# Patient Record
Sex: FEMALE | Race: WHITE | NOT HISPANIC OR LATINO | Employment: OTHER | URBAN - METROPOLITAN AREA
[De-identification: names, ages, dates, MRNs, and addresses within clinical notes are randomized per-mention and may not be internally consistent; named-entity substitution may affect disease eponyms.]

---

## 2017-08-02 ENCOUNTER — HOSPITAL ENCOUNTER (EMERGENCY)
Facility: HOSPITAL | Age: 52
Discharge: HOME | End: 2017-08-02
Attending: EMERGENCY MEDICINE
Payer: COMMERCIAL

## 2017-08-02 ENCOUNTER — HOSPITAL ENCOUNTER (EMERGENCY)
Dept: RADIOLOGY | Facility: HOSPITAL | Age: 52
Discharge: HOME | End: 2017-08-02
Payer: COMMERCIAL

## 2017-08-02 VITALS
DIASTOLIC BLOOD PRESSURE: 70 MMHG | SYSTOLIC BLOOD PRESSURE: 128 MMHG | HEIGHT: 67 IN | RESPIRATION RATE: 16 BRPM | BODY MASS INDEX: 36.88 KG/M2 | HEART RATE: 90 BPM | OXYGEN SATURATION: 100 % | TEMPERATURE: 99 F | WEIGHT: 235 LBS

## 2017-08-02 DIAGNOSIS — K52.9 COLITIS: ICD-10-CM

## 2017-08-02 DIAGNOSIS — K59.00 CONSTIPATION: Primary | ICD-10-CM

## 2017-08-02 LAB
ALBUMIN SERPL BCP-MCNC: 3.4 G/DL (ref 3.5–5)
ALP SERPL-CCNC: 79 U/L (ref 46–116)
ALT SERPL W P-5'-P-CCNC: 24 U/L (ref 12–78)
ANION GAP SERPL CALCULATED.3IONS-SCNC: 10 MMOL/L (ref 4–13)
AST SERPL W P-5'-P-CCNC: 22 U/L (ref 5–45)
BASOPHILS # BLD AUTO: 0 THOUSANDS/ΜL (ref 0–0.1)
BASOPHILS NFR BLD AUTO: 0 % (ref 0–1)
BILIRUB SERPL-MCNC: 0.7 MG/DL (ref 0.2–1)
BUN SERPL-MCNC: 6 MG/DL (ref 5–25)
CALCIUM SERPL-MCNC: 8.9 MG/DL (ref 8.3–10.1)
CHLORIDE SERPL-SCNC: 101 MMOL/L (ref 100–108)
CLARITY, POC: CLEAR
CO2 SERPL-SCNC: 29 MMOL/L (ref 21–32)
COLOR, POC: YELLOW
CREAT SERPL-MCNC: 0.8 MG/DL (ref 0.6–1.3)
EOSINOPHIL # BLD AUTO: 0 THOUSAND/ΜL (ref 0–0.61)
EOSINOPHIL NFR BLD AUTO: 0 % (ref 0–6)
ERYTHROCYTE [DISTWIDTH] IN BLOOD BY AUTOMATED COUNT: 14.4 % (ref 11.6–15.1)
EXT BILIRUBIN, UA: NORMAL
EXT BLOOD URINE: NORMAL
EXT GLUCOSE, UA: NORMAL
EXT KETONES: NORMAL
EXT NITRITE, UA: NORMAL
EXT PH, UA: 8
EXT PROTEIN, UA: NORMAL
EXT SPECIFIC GRAVITY, UA: 1
EXT UROBILINOGEN: 0.2
GFR SERPL CREATININE-BSD FRML MDRD: 85 ML/MIN/1.73SQ M
GLUCOSE SERPL-MCNC: 178 MG/DL (ref 65–140)
HCT VFR BLD AUTO: 39.7 % (ref 34.8–46.1)
HGB BLD-MCNC: 13 G/DL (ref 11.5–15.4)
LIPASE SERPL-CCNC: 52 U/L (ref 73–393)
LYMPHOCYTES # BLD AUTO: 1.9 THOUSANDS/ΜL (ref 0.6–4.47)
LYMPHOCYTES NFR BLD AUTO: 19 % (ref 14–44)
MCH RBC QN AUTO: 28.3 PG (ref 26.8–34.3)
MCHC RBC AUTO-ENTMCNC: 32.7 G/DL (ref 31.4–37.4)
MCV RBC AUTO: 87 FL (ref 82–98)
MONOCYTES # BLD AUTO: 0.37 THOUSAND/ΜL (ref 0.17–1.22)
MONOCYTES NFR BLD AUTO: 4 % (ref 4–12)
NEUTROPHILS # BLD AUTO: 7.8 THOUSANDS/ΜL (ref 1.85–7.62)
NEUTS SEG NFR BLD AUTO: 77 % (ref 43–75)
PLATELET # BLD AUTO: 184 THOUSANDS/UL (ref 149–390)
PMV BLD AUTO: 11.1 FL (ref 8.9–12.7)
POTASSIUM SERPL-SCNC: 4 MMOL/L (ref 3.5–5.3)
PROT SERPL-MCNC: 7.3 G/DL (ref 6.4–8.2)
RBC # BLD AUTO: 4.59 MILLION/UL (ref 3.81–5.12)
SODIUM SERPL-SCNC: 140 MMOL/L (ref 136–145)
T4 FREE SERPL-MCNC: 1.44 NG/DL (ref 0.76–1.46)
TSH SERPL DL<=0.05 MIU/L-ACNC: 0.28 UIU/ML (ref 0.36–3.74)
WBC # BLD AUTO: 10.07 THOUSAND/UL (ref 4.31–10.16)
WBC # BLD EST: NORMAL 10*3/UL

## 2017-08-02 PROCEDURE — 80053 COMPREHEN METABOLIC PANEL: CPT | Performed by: EMERGENCY MEDICINE

## 2017-08-02 PROCEDURE — 96375 TX/PRO/DX INJ NEW DRUG ADDON: CPT

## 2017-08-02 PROCEDURE — 96361 HYDRATE IV INFUSION ADD-ON: CPT

## 2017-08-02 PROCEDURE — 83690 ASSAY OF LIPASE: CPT | Performed by: EMERGENCY MEDICINE

## 2017-08-02 PROCEDURE — 74177 CT ABD & PELVIS W/CONTRAST: CPT

## 2017-08-02 PROCEDURE — 96374 THER/PROPH/DIAG INJ IV PUSH: CPT

## 2017-08-02 PROCEDURE — 84439 ASSAY OF FREE THYROXINE: CPT | Performed by: EMERGENCY MEDICINE

## 2017-08-02 PROCEDURE — 85025 COMPLETE CBC W/AUTO DIFF WBC: CPT | Performed by: EMERGENCY MEDICINE

## 2017-08-02 PROCEDURE — 36415 COLL VENOUS BLD VENIPUNCTURE: CPT | Performed by: EMERGENCY MEDICINE

## 2017-08-02 PROCEDURE — 84443 ASSAY THYROID STIM HORMONE: CPT | Performed by: EMERGENCY MEDICINE

## 2017-08-02 PROCEDURE — 99285 EMERGENCY DEPT VISIT HI MDM: CPT

## 2017-08-02 PROCEDURE — 81002 URINALYSIS NONAUTO W/O SCOPE: CPT | Performed by: EMERGENCY MEDICINE

## 2017-08-02 RX ORDER — DULOXETIN HYDROCHLORIDE 30 MG/1
90 CAPSULE, DELAYED RELEASE ORAL DAILY
COMMUNITY

## 2017-08-02 RX ORDER — MORPHINE SULFATE 60 MG/1
60 CAPSULE, EXTENDED RELEASE ORAL DAILY
COMMUNITY

## 2017-08-02 RX ORDER — ONDANSETRON 2 MG/ML
4 INJECTION INTRAMUSCULAR; INTRAVENOUS ONCE
Status: COMPLETED | OUTPATIENT
Start: 2017-08-02 | End: 2017-08-02

## 2017-08-02 RX ORDER — CLONIDINE HYDROCHLORIDE 0.1 MG/1
0.1 TABLET ORAL EVERY 12 HOURS SCHEDULED
COMMUNITY

## 2017-08-02 RX ORDER — FENTANYL CITRATE 50 UG/ML
75 INJECTION, SOLUTION INTRAMUSCULAR; INTRAVENOUS ONCE
Status: COMPLETED | OUTPATIENT
Start: 2017-08-02 | End: 2017-08-02

## 2017-08-02 RX ORDER — METRONIDAZOLE 500 MG/1
500 TABLET ORAL 2 TIMES DAILY
Qty: 14 TABLET | Refills: 0 | Status: SHIPPED | OUTPATIENT
Start: 2017-08-02 | End: 2017-08-09

## 2017-08-02 RX ORDER — AMITRIPTYLINE HYDROCHLORIDE 10 MG/1
50 TABLET, FILM COATED ORAL
COMMUNITY

## 2017-08-02 RX ORDER — LEVOTHYROXINE SODIUM 0.12 MG/1
100 TABLET ORAL DAILY
COMMUNITY

## 2017-08-02 RX ORDER — OXYCODONE HYDROCHLORIDE 30 MG/1
30 TABLET, FILM COATED, EXTENDED RELEASE ORAL 4 TIMES DAILY
COMMUNITY

## 2017-08-02 RX ORDER — PREGABALIN 100 MG/1
100 CAPSULE ORAL 2 TIMES DAILY
COMMUNITY

## 2017-08-02 RX ORDER — SULFAMETHOXAZOLE AND TRIMETHOPRIM 800; 160 MG/1; MG/1
1 TABLET ORAL 2 TIMES DAILY
Qty: 14 TABLET | Refills: 0 | Status: SHIPPED | OUTPATIENT
Start: 2017-08-02 | End: 2017-08-09

## 2017-08-02 RX ORDER — LISINOPRIL 10 MG/1
10 TABLET ORAL DAILY
COMMUNITY

## 2017-08-02 RX ORDER — PRAVASTATIN SODIUM 10 MG
10 TABLET ORAL DAILY
COMMUNITY

## 2017-08-02 RX ADMIN — FENTANYL CITRATE 75 MCG: 50 INJECTION, SOLUTION INTRAMUSCULAR; INTRAVENOUS at 14:46

## 2017-08-02 RX ADMIN — IOHEXOL 100 ML: 350 INJECTION, SOLUTION INTRAVENOUS at 19:10

## 2017-08-02 RX ADMIN — ONDANSETRON 4 MG: 2 INJECTION INTRAMUSCULAR; INTRAVENOUS at 14:47

## 2017-08-02 RX ADMIN — SODIUM CHLORIDE 1000 ML: 0.9 INJECTION, SOLUTION INTRAVENOUS at 14:46

## 2019-05-12 ENCOUNTER — HOSPITAL ENCOUNTER (EMERGENCY)
Facility: HOSPITAL | Age: 54
Discharge: HOME | End: 2019-05-12
Attending: EMERGENCY MEDICINE | Admitting: EMERGENCY MEDICINE
Payer: COMMERCIAL

## 2019-05-12 ENCOUNTER — APPOINTMENT (EMERGENCY)
Dept: RADIOLOGY | Facility: HOSPITAL | Age: 54
End: 2019-05-12
Payer: COMMERCIAL

## 2019-05-12 VITALS
BODY MASS INDEX: 38.45 KG/M2 | SYSTOLIC BLOOD PRESSURE: 123 MMHG | RESPIRATION RATE: 16 BRPM | DIASTOLIC BLOOD PRESSURE: 58 MMHG | HEART RATE: 90 BPM | HEIGHT: 67 IN | OXYGEN SATURATION: 97 % | WEIGHT: 245 LBS | TEMPERATURE: 96.8 F

## 2019-05-12 DIAGNOSIS — S80.211A ABRASION, RIGHT KNEE, INITIAL ENCOUNTER: ICD-10-CM
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
## 2019-05-12 DIAGNOSIS — S20.411A ABRASION OF RIGHT SIDE OF BACK, INITIAL ENCOUNTER: ICD-10-CM

## 2019-05-12 DIAGNOSIS — S82.831A CLOSED AVULSION FRACTURE OF DISTAL END OF RIGHT FIBULA, INITIAL ENCOUNTER: ICD-10-CM

## 2019-05-12 PROCEDURE — 73630 X-RAY EXAM OF FOOT: CPT

## 2019-05-12 PROCEDURE — 90471 IMMUNIZATION ADMIN: CPT

## 2019-05-12 PROCEDURE — 90715 TDAP VACCINE 7 YRS/> IM: CPT | Performed by: EMERGENCY MEDICINE

## 2019-05-12 PROCEDURE — 73610 X-RAY EXAM OF ANKLE: CPT

## 2019-05-12 PROCEDURE — 99284 EMERGENCY DEPT VISIT MOD MDM: CPT | Performed by: EMERGENCY MEDICINE

## 2019-05-12 PROCEDURE — 99283 EMERGENCY DEPT VISIT LOW MDM: CPT

## 2019-05-12 RX ADMIN — TETANUS TOXOID, REDUCED DIPHTHERIA TOXOID AND ACELLULAR PERTUSSIS VACCINE, ADSORBED 0.5 ML: 5; 2.5; 8; 8; 2.5 SUSPENSION INTRAMUSCULAR at 20:00

## 2019-05-14 ENCOUNTER — OFFICE VISIT (OUTPATIENT)
Dept: OBGYN CLINIC | Facility: CLINIC | Age: 54
End: 2019-05-14
Payer: COMMERCIAL

## 2019-05-14 VITALS
HEIGHT: 68 IN | BODY MASS INDEX: 37.13 KG/M2 | DIASTOLIC BLOOD PRESSURE: 75 MMHG | WEIGHT: 245 LBS | HEART RATE: 86 BPM | SYSTOLIC BLOOD PRESSURE: 111 MMHG

## 2019-05-14 DIAGNOSIS — S93.491A SPRAIN OF ANTERIOR TALOFIBULAR LIGAMENT OF RIGHT ANKLE, INITIAL ENCOUNTER: Primary | ICD-10-CM

## 2019-05-14 PROCEDURE — 99203 OFFICE O/P NEW LOW 30 MIN: CPT | Performed by: ORTHOPAEDIC SURGERY

## 2019-05-24 ENCOUNTER — EVALUATION (OUTPATIENT)
Dept: PHYSICAL THERAPY | Facility: CLINIC | Age: 54
End: 2019-05-24
Payer: COMMERCIAL

## 2019-05-24 DIAGNOSIS — S93.491A SPRAIN OF ANTERIOR TALOFIBULAR LIGAMENT OF RIGHT ANKLE, INITIAL ENCOUNTER: ICD-10-CM

## 2019-05-24 PROCEDURE — 97110 THERAPEUTIC EXERCISES: CPT | Performed by: PHYSICAL THERAPIST

## 2019-05-24 PROCEDURE — 97162 PT EVAL MOD COMPLEX 30 MIN: CPT | Performed by: PHYSICAL THERAPIST

## 2019-05-29 ENCOUNTER — APPOINTMENT (OUTPATIENT)
Dept: PHYSICAL THERAPY | Facility: CLINIC | Age: 54
End: 2019-05-29
Payer: COMMERCIAL

## 2021-06-16 ENCOUNTER — OFFICE VISIT (OUTPATIENT)
Dept: BARIATRICS | Facility: CLINIC | Age: 56
End: 2021-06-16

## 2021-06-16 VITALS
RESPIRATION RATE: 16 BRPM | HEIGHT: 66 IN | TEMPERATURE: 97.8 F | BODY MASS INDEX: 40.29 KG/M2 | WEIGHT: 250.7 LBS | DIASTOLIC BLOOD PRESSURE: 72 MMHG | HEART RATE: 91 BPM | SYSTOLIC BLOOD PRESSURE: 118 MMHG

## 2021-06-16 DIAGNOSIS — E66.01 OBESITY, CLASS III, BMI 40-49.9 (MORBID OBESITY) (HCC): Primary | ICD-10-CM

## 2021-06-16 PROCEDURE — RECHECK

## 2021-06-16 RX ORDER — LIDOCAINE 50 MG/G
1 PATCH TOPICAL DAILY
COMMUNITY

## 2021-06-16 RX ORDER — OLANZAPINE 7.5 MG/1
7.5 TABLET ORAL
COMMUNITY

## 2021-06-16 RX ORDER — ESCITALOPRAM OXALATE 20 MG/1
20 TABLET ORAL
COMMUNITY

## 2021-06-16 NOTE — PROGRESS NOTES
Bariatric Behavioral Health Evaluation    Presenting Problem patient here to improve health, increase activity, reduce chronic pain and prevent family disease  Is the patient seeking Bariatric Surgery Eval? Yes  If yes how long have you researched this surgery option  Realizes Post- Op Requirements? Yes     Pre-morbid level of function and history of present illness: patient has medical issues  Psychiatric/Psychological Treatment Diagnosis: patient has Mental Health diagnosis of Bipolar II  Patient is prescribed medication by JUAN at [facility]  Outpatient Counselor Yes  Counselor Tiana Goddard Phone  Has been doing phone therapy once a month for the past year due to Hilario  Psychiatrist Yes   Celena Box (does not know last name )  Has seen Jose Kimball for the past year  Sees him every two months  Psychiatrist Phone     Have you had Inpatient Treatment? No    Family Constellation (include relationship with each and Psych/Med HX)    Additional comments/stressors related to family/relationships/peer support  Patient is on disability since 2015; has recently lost his job  Patient and spouse are raising their grandchildren, Stephan Garcia 25 and David Woo, 15  Have Physical custody of them since birth  Mother (their daughter) has substance abuse issues  Physical/Psychological Assessment:     Appearance: Tired  Sociability: quiet  Affect: flat  Mood: sad  Thought Process: coherent  Speech: normal  Content: no impairment  Orientation: person  Yes , place  Yes , time  Yes , normal attention span  Yes , normal memory  Yes   and normal judgement  Yes   Insight: emotional  fair    Risk Assessment:     none    Recommendations: Patient may require psychiatric evaluation  Will have office SW determine if it is needed  Observation:     Interviews this interview only       Based on the previous information, the client presents the following risk of harm to self or others: low     Note Patient reports Mental Health diagnosis of Bipolar II  She is prescribed medication by JUAN at [facility]  Patient was educated regarding the impact of nicotine and alcohol of the post surgery bariatric patient  Evaluation not completed at this time  Follow up appointment scheduled with AG to complete evaluation and determine if psychiatric evaluation will be required  BARIATRIC SURGERY EDUCATION CHECKLIST    I have received education related to my bariatric surgery process and understand:    Patients may be required to complete a psychiatric evaluation and receive clearance for surgery from their psychiatrist     Patients who undergo weight loss surgery are at higher risk of increased mental health concerns and suicide attempts  Patients may be required to complete a full substance abuse evaluation and then complete all treatment recommendations prior to surgery  If diagnosis of abuse/dependence results, patient may be required to remain sober for one (1) year before having bariatric surgery  Patients on psychiatric medications should check with their provider to discuss psychiatric medications and the changes in absorption  Patient should discuss all time release medications with provider and take all medications as prescribed  The recommendation is that there is no use of  any tobacco products, Hookah or  vapes for the bariatric post-operation patient  Bariatric surgery patients should not consume alcohol as a post-operative patient as it may increase risk of numerous health conditions including but not limited to alcohol abuse and ulcers  There is a possibility of weight regain if patient does not follow all program guidelines and recommendations  Bariatric surgery patients should exercise thirty (30) to sixty (60) minutes per day to maintain post-surgical weight loss      Research indicates that bariatric patients are more successful when they see a therapist for up to two (2) years post-op  Patients will follow all medical and dietary recommendations provided  Patient will keep all scheduled appointments and follow up with their physician for a minimum of five (5) years  Patient will take all vitamins as recommended  Post-operative vitamins are life-long  Patient reviewed Bariatric Surgery Education Checklist and agrees they have received education on these issues

## 2021-06-16 NOTE — PROGRESS NOTES
Bariatric Nutrition Assessment Note    Type of surgery    Preop  Surgery Date: TBD  Surgeon: Dr Ernestina Zavala  63 y o   female     Wt with BMI of 25: 156 lbs  Pre-Op Excess Wt: 94#  Blood pressure 118/72, pulse 91, temperature 97 8 °F (36 6 °C), temperature source Tympanic, resp  rate 16, height 5' 6 3" (1 684 m), weight 114 kg (250 lb 11 2 oz), not currently breastfeeding  Body mass index is 40 1 kg/m²     Bear Anthony Equation 1749 BMR                                          [id number] cals to lose 1-2 lb/week    Weight History   Onset of Obesity: Adult  Family history of obesity: No  Wt Loss Attempts: Commercial Programs ([email or web address], [employer], etc )  Self Created Diets (Portion Control, Healthy Food Choices, etc )  Maximum Wt Lost: 30 lbs not drinking alcohol    Review of History and Medications   Past Medical History:   Diagnosis Date    Chronic low back pain     Chronic pain     Diabetes mellitus ([address] )     Diabetic neuropathy, painful (HCC)     Disease of thyroid gland     Hyperlipidemia     Hypertension     Obesity     Psychiatric disorder      Past Surgical History:   Procedure Laterality Date    APPENDECTOMY      CARPAL TUNNEL RELEASE Bilateral     FOOT NEUROMA SURGERY Left     NERVE BLOCK      B/L simpethetic nerve block x3    SACRAL NERVE STIMULATOR PLACEMENT      SPINE SURGERY       Social History     Socioeconomic History    Marital status: /Civil Union     Spouse name: None    Number of children: None    Years of education: None    Highest education level: None   Occupational History    None   Tobacco Use    Smoking status: Never Smoker    Smokeless tobacco: Never Used    Tobacco comment: only medical marijuana   Substance and Sexual Activity    Alcohol use: No    Drug use: No    Sexual activity: None   Other Topics Concern    None   Social History Narrative    None     Social Determinants of Health Financial Resource Strain:     Difficulty of Paying Living Expenses:    Food Insecurity:     Worried About Running Out of Food in the Last Year:     [address] in the Last Year:    Transportation Needs:     Lack of Transportation (Medical):      Lack of Transportation (Non-Medical):    Physical Activity:     Days of Exercise per Week:     Minutes of Exercise per Session:    Stress:     Feeling of Stress :    Social Connections:     Frequency of Communication with Friends and Family:     Frequency of Social Gatherings with Friends and Family:     Attends Christianity Services:     Active Member of Clubs or Organizations:     Attends Club or Organization Meetings:     Marital Status:    Intimate Partner Violence:     Fear of Current or Ex-Partner:     Emotionally Abused:     Physically Abused:     Sexually Abused:        Current Outpatient Medications:     amitriptyline (ELAVIL) 10 mg tablet, Take 50 mg by mouth daily at bedtime , Disp: , Rfl:     Clobetasol Propionate 0 025 % CREA, Apply topically, Disp: , Rfl:     DULoxetine (CYMBALTA) 30 mg delayed release capsule, Take 90 mg by mouth daily  , Disp: , Rfl:     levothyroxine 125 mcg tablet, Take 100 mcg by mouth daily , Disp: , Rfl:     lidocaine (LIDODERM) 5 %, Apply 1 patch topically daily Remove & Discard patch within 12 hours or as directed by MD, Disp: , Rfl:     lisinopril (ZESTRIL) 10 mg tablet, Take 10 mg by mouth daily, Disp: , Rfl:     metFORMIN (GLUCOPHAGE) 500 mg tablet, Take 250 mg by mouth 2 (two) times a day with meals, Disp: , Rfl:     OLANZapine (ZyPREXA) 7 5 mg tablet, Take 7 5 mg by mouth daily at bedtime, Disp: , Rfl:     other medication, see sig,, Medical Marijuana, Disp: , Rfl:     OxyCODONE HCl ER (OxyCONTIN) 30 MG T12A, Take 30 mg by mouth 4 (four) times a day, Disp: , Rfl:     pravastatin (PRAVACHOL) 10 mg tablet, Take 10 mg by mouth daily, Disp: , Rfl:     pregabalin (LYRICA) 100 mg capsule, Take 100 mg by mouth 2 (two) times a day, Disp: , Rfl:     TRAZODONE HCL PO, Take 50 mg by mouth 50mg to 100mg daily as needed, Disp: , Rfl:     cloNIDine (CATAPRES) 0 1 mg tablet, Take 0 1 mg by mouth every 12 (twelve) hours, Disp: , Rfl:     escitalopram (Lexapro) 20 mg tablet, Take 20 mg by mouth, Disp: , Rfl:     morphine (SCARLET) 60 MG 24 hr capsule, Take 60 mg by mouth daily, Disp: , Rfl:   Food Intake and Lifestyle Assessment   Food Intake Assessment completed via usual diet recall  Breakfast: Haris Charms or Honey Nut Cheerios other cereal dry  Snack: occ Glucerna  Lunch: More cereal   Snack: 0  Dinner: Cereal or meat, starch, vegetable  Snack: Cereal   Eating like this over a year, used to be [employer] intake: water and regular soda-1 soda per day  Protein supplement: 0  Estimated protein intake per day: 30 g  Estimated fluid intake per day: 96 oz  Meals eaten away from home: takeout once a week or every 2 weeks  Typical meal pattern: 3 meals per day and 0 snacks per day  Eating Behaviors: Binge eating and Craves sweet foods  Food allergies or intolerances:    Allergies   Allergen Reactions    Aripiprazole Hives and GI Intolerance    Belbuca [Buprenorphine Hcl] Hives    Buspar [Buspirone] Hives    Gabapentin Hives    Lamictal [Lamotrigine] Hives    Morphine Hives    Oxycodone Hives and GI Intolerance     Generic Form of Oxy     Cultural or Yazidism considerations: N/A    Physical Assessment  Physical Activity  Types of exercise: None  Current physical limitations: diabetic neuropathy in feet    Psychosocial Assessment   Support systems: spouse  Socioeconomic factors: lives with spouse    Nutrition Diagnosis  Diagnosis: Overweight / Obesity (NC-3 3)  Related to: Physical inactivity and Excessive energy intake  As Evidenced by: BMI >25     Nutrition Prescription: Recommend the following diet  Low fat, Low sugar and High protein    Interventions and Teaching   Discussed pre-op and post-op nutrition guidelines  Patient educated and handouts provided  Surgical changes to stomach / GI  Capacity of post-surgery stomach  Diet progression  Adequate hydration  Sugar and fat restriction to decrease "dumping syndrome"  Fat restriction to decrease steatorrhea  Expected weight loss  Weight loss plateaus/ possibility of weight regain  Exercise  Suggestions for pre-op diet  Nutrition considerations after surgery  Protein supplements  Meal planning and preparation  Appropriate carbohydrate, protein, and fat intake, and food/fluid choices to maximize safe weight loss, nutrient intake, and tolerance   Dietary and lifestyle changes  Possible problems with poor eating habits  Intuitive eating  Techniques for self monitoring and keeping daily food journal  Potential for food intolerance after surgery, and ways to deal with them including: lactose intolerance, nausea, reflux, vomiting, diarrhea, food intolerance, appetite changes, gas  Vitamin / Mineral supplementation of Multivitamin with minerals and Vitamin D    Education provided to: patient    Barriers to learning: No barriers identified  Readiness to change: preparation    Prior research on procedure: discussed with provider    Comprehension: verbalizes understanding     Expected Compliance: good  Recommendations  Pt is an appropriate candidate for surgery   Yes  Evaluation / Monitoring  Dietitian to Monitor: Eating pattern as discussed Body weight Lab values Physical activity    Goals  Food journal, Exercise 30 minutes 5 times per week, Complete lession plans 1-6 and Eat 3 meals per day    Time Spent:   1 Hour

## 2021-07-01 ENCOUNTER — OFFICE VISIT (OUTPATIENT)
Dept: BARIATRICS | Facility: CLINIC | Age: 56
End: 2021-07-01

## 2021-07-01 DIAGNOSIS — E66.01 OBESITY, CLASS III, BMI 40-49.9 (MORBID OBESITY) (HCC): Primary | ICD-10-CM

## 2021-07-01 PROCEDURE — RECHECK: Performed by: SOCIAL WORKER

## 2021-07-01 NOTE — PROGRESS NOTES
Pablo Lara  is a 63 y o    female    :  1965  WT [address] [place] [address]    Target weight: 243   Current patient status: losing weight to 246 6   She will need Psychiatric evaluation from her practitioner at [facility]  She will be taking the form and our letter and the Release of information to the next appointment with her therapist  On   Discussion around stability of her bi-polar disorder  The doctor has needed to adjust her medications two  Months ago or so  She has been involved with [facility] for three years  Workflow:  · Psych and/or D+A Clearance: needed  Goals of research on bariatric surgery and join the Facebook group  Next appointment is scheduled for * with RAYMOND Elaine, ELADIAW  _____________________________

## 2022-09-22 PROBLEM — F31.81 BIPOLAR II DISORDER (HCC): Status: ACTIVE | Noted: 2022-09-22

## 2022-09-22 PROBLEM — F33.1 MAJOR DEPRESSIVE DISORDER, RECURRENT EPISODE, MODERATE (HCC): Status: ACTIVE | Noted: 2022-09-22

## 2022-09-26 ENCOUNTER — SOCIAL WORK (OUTPATIENT)
Dept: BEHAVIORAL/MENTAL HEALTH CLINIC | Facility: CLINIC | Age: 57
End: 2022-09-26
Payer: COMMERCIAL

## 2022-09-26 DIAGNOSIS — F33.1 MAJOR DEPRESSIVE DISORDER, RECURRENT EPISODE, MODERATE (HCC): Primary | ICD-10-CM

## 2022-09-26 PROCEDURE — 90834 PSYTX W PT 45 MINUTES: CPT | Performed by: COUNSELOR

## 2022-09-26 NOTE — PSYCH
Psychotherapy Provided: Individual Psychotherapy 50 minutes     Length of time in session: 50 minutes, follow up in 1 month  Start time:  10:00am  End time:  10:50am    Encounter Diagnosis     ICD-10-CM    1  Major depressive disorder, recurrent episode, moderate (HCC)  F33 1        Goals addressed in session: Goal 1     Pain:      none    0    Current suicide risk : Low     D: Clara was seen in the office today  She reported that her granddaughter, who she raised, is doing better and not as hostile toward her  She said this is "wonderful" and said she hopes she continues to express herself more  Carol Armstrong said that Rodger Correa is telling her she loves her again saying "she hasn't said this for a very long time "  Carol Armstrong shared that she still sleeps a lot and never feels rested  She said her PCP gave her a referral for a sleep study a month ago but she's not done anything about it  A:  Carol Armstrong had full affect  No SI/HI/SA was reported  She said she lost 5-6 pounds and is happy about this  She was engaged and talkative in our session today  P:  Carol Armstrong said she wants to make an effort to call for her sleep study saying she knows it could possible take about a month or so to get an appointment  Behavioral Health Treatment Plan [facility]: Diagnosis and Treatment Plan explained to Tejas Ann relates understanding diagnosis and is agreeable to Treatment Plan   Yes

## 2022-11-15 ENCOUNTER — SOCIAL WORK (OUTPATIENT)
Dept: BEHAVIORAL/MENTAL HEALTH CLINIC | Facility: CLINIC | Age: 57
End: 2022-11-15

## 2022-11-15 DIAGNOSIS — F33.1 MAJOR DEPRESSIVE DISORDER, RECURRENT EPISODE, MODERATE (HCC): Primary | ICD-10-CM

## 2022-11-15 DIAGNOSIS — F31.81 BIPOLAR II DISORDER (HCC): ICD-10-CM

## 2022-11-15 NOTE — PSYCH
Psychotherapy Provided: Individual Psychotherapy 45 minutes     Length of time in session: 45 minutes, follow up in 1 month  Encounter Diagnosis     ICD-10-CM    1  Major depressive disorder, recurrent episode, moderate ([facility])  F33 1    2  Bipolar II disorder ([facility] )  F31 81        Goals addressed in session: Goal 1     Pain:      none    0    Current suicide risk : Low     D:  Hilario Acosta said she feels somewhat depressed due to her 13year old granddaughter, Georgie Galaviz, who she's raised most all of her life  She went into detail about the issues they are having and is concerned that she's doing all she can for her  She said "otherwise, I wouldn't feel as depressed and feels it's all due to Tyoria Anastacia  A:  Full affect  No SI/HI/SA  Hilario Acosta scored 12 today on the PHQ-9 screener indicating moderate depression  She was engaged and talkative in our session today   P:  Deena Sun she called for a sleep study but has not heard back to schedule yet  She said she continues to feel very tired all the time and can't sit to watch a movie in it's entirety without falling asleep  P:  Hilario Acosta will practice good self care and do nice things for herself to offset what she's feeling regarding her granddaughter  Behavioral Health Treatment Plan [facility]: Diagnosis and Treatment Plan explained to Shantelle Gonzalez relates understanding diagnosis and is agreeable to Treatment Plan   Yes     Visit start and stop times:    11/15/22  Start Time: 1005  Stop Time: 1050  Total Visit Time: 45 minutes

## 2022-12-14 ENCOUNTER — SOCIAL WORK (OUTPATIENT)
Dept: BEHAVIORAL/MENTAL HEALTH CLINIC | Facility: CLINIC | Age: 57
End: 2022-12-14

## 2022-12-14 DIAGNOSIS — F31.81 BIPOLAR II DISORDER (HCC): ICD-10-CM

## 2022-12-14 DIAGNOSIS — F33.1 MAJOR DEPRESSIVE DISORDER, RECURRENT EPISODE, MODERATE (HCC): Primary | ICD-10-CM

## 2022-12-14 NOTE — BH TREATMENT PLAN
Morris Ya  1965       Date of Initial Treatment Plan: 12/14/22   Date of Current Treatment Plan: 12/14/22    Treatment Plan Number 1     Strengths/Personal Resources for Self Care: Responsible, caring, kind  Diagnosis:   1  Major depressive disorder, recurrent episode, moderate ([facility] )        2  Bipolar II disorder ([facility] )            Area of Needs: Depression       Long Term Goal 1: I want to feel more connected to my granddaughter Susen Koyanagi    Target Date: 6-14-23  Completion Date: TBD         Short Term Objectives for Goal 1: Try to find a common interests to do from time to time to bond and connect  Long Term Goal 2: Figure out my sleep issue  Target Date: 6-14-23  Completion Date: TBD    Short Term Objectives for Goal 2: N/A and Go for a sleep study in January 2023             GOAL 1: Modality:  Communication and interpersonal skills  [facility] Health [facility]: Diagnosis and Treatment Plan explained to Trey simental understanding diagnosis and is agreeable to Treatment Plan

## 2022-12-14 NOTE — PSYCH
Psychotherapy Provided: Individual Psychotherapy 50 minutes     Length of time in session: 50 minutes, follow up in 2 months  Encounter Diagnosis     ICD-10-CM    1  Major depressive disorder, recurrent episode, moderate ([facility])  F33 1       2  Bipolar II disorder ([facility] )  F31 81           Goals addressed in session: Goal 1     Pain:      none    0    Current suicide risk : Low     D:  Clara shared that she is scheduled for her sleep study on January 3rd  She said she continues to feel very tired through the day and can't read or watch a movie saying she will fall asleep  She also commented that she is being lowed on the Oxycodone saying she's been on a certain dose for years and doesn't feel this is calling her sleepiness but said "it will be interesting to see if this is causing the issue "  Adin Patel talked about her granddaughter Brionna Grove and how challenging she can be  She said she would like to find ways to bond with her more in the hopes of having a better relationship with Brionna Grove  A:  Brionna Grove had full affect  No SI/HI/SA  She was engaged and talkative  She was 10 minutes late for her appointment today saying she was talking with her  and time got away from her  P:  Clara shared something that her grandson Ace Dawkins had said to her  Clara and her  Maryam How have raised Brionna Grove and Ace Dawkins since they were little and now one is at college and another in high school  She said she felt "hurt" when Ace Dawkins commented that her and Maryam How shouldn't have been the ones to raise them and that "should have been my mother and father", as per Clara's reporting today  Adin Patel said she is okay now but said at the time, she was hurt due to some other things he said to her too  P:  Adin Patel will go for her sleep study and practice good self-care      Behavioral Health Treatment Plan [facility]: Diagnosis and Treatment Plan explained to Felisa Tierney relates understanding diagnosis and is agreeable to Treatment Plan   Yes     Visit start and stop times:    12/14/22  Start Time: 1010  Stop Time: 1100  Total Visit Time: 50 minutes

## 2023-01-30 ENCOUNTER — SOCIAL WORK (OUTPATIENT)
Dept: BEHAVIORAL/MENTAL HEALTH CLINIC | Facility: CLINIC | Age: 58
End: 2023-01-30

## 2023-01-30 DIAGNOSIS — F33.1 MAJOR DEPRESSIVE DISORDER, RECURRENT EPISODE, MODERATE (HCC): Primary | ICD-10-CM

## 2023-01-30 DIAGNOSIS — F31.81 BIPOLAR II DISORDER (HCC): ICD-10-CM

## 2023-01-30 NOTE — PSYCH
Behavioral Health Psychotherapy Progress Note    Psychotherapy Provided: Individual Psychotherapy     1  Major depressive disorder, recurrent episode, moderate ([facility] )        2  Bipolar II disorder ([facility] )            Goals addressed in session: Goal 1     DATA: Did crisis plan today with client  It would not print so I could not obtain a wet signature today  Jillian Carroll shared that her biggest stressor continues to be her granddaughter Joi Ariza  She said "I can't do anything right "  Joi Ariza will turn 16 in April  Jillian Carroll has raised her and her half brother since they were babies  She said she takes things one day at a time and we talked about the importance of good self care  She said she had her sleep study and goes tomorrow for the results  During this session, this clinician used the following therapeutic modalities: Solution-Focused Therapy    Substance Abuse was not addressed during this session  If the client is diagnosed with a co-occurring substance use disorder, please indicate any changes in the frequency or amount of use:N/A  Stage of change for addressing substance use diagnoses: No substance use/Not applicable    ASSESSMENT:  Tommy Torres presents with a Euthymic/ normal and Dysthymic mood  her affect is Normal range and intensity, which is congruent, with her mood and the content of the session  The client has made progress on their goals  Tommy Torres presents with a none risk of suicide, none risk of self-harm, and none risk of harm to others  For any risk assessment that surpasses a "low" rating, a safety plan must be developed  A safety plan was indicated: no  If yes, describe in detail     PLAN: Between sessions, Tommy Torres will do things for good self care to manage the stress she is feeling  At the next session, the therapist will use Solution-Focused Therapy to address issues she has with her granddaughter      Behavioral Health Treatment Plan and Discharge Planning: Lauro Farmer is aware of and agrees to continue to work on their treatment plan  They have identified and are working toward their discharge goals   yes    Visit start and stop times:    01/30/23  Start Time: 1000  Stop Time: 1050  Total Visit Time: 50 minutes

## 2023-02-27 DIAGNOSIS — F31.81 BIPOLAR II DISORDER (HCC): ICD-10-CM

## 2023-02-27 DIAGNOSIS — F33.1 MAJOR DEPRESSIVE DISORDER, RECURRENT EPISODE, MODERATE (HCC): Primary | ICD-10-CM

## 2023-02-27 NOTE — TELEPHONE ENCOUNTER
Medication Refill Request     Name of Medication olanzapine  Dose/Frequency 10mg qhs  Quantity 30  Verified pharmacy   [x]  Verified ordering Provider   [x]  Does patient have enough for the next 3 days? Yes [] No [x]  Does patient have a follow-up appointment scheduled?  Yes [x] No []   If so when is appointment: 04/06/2023

## 2023-02-28 RX ORDER — OLANZAPINE 10 MG/1
10 TABLET ORAL
Qty: 30 TABLET | Refills: 1 | Status: SHIPPED | OUTPATIENT
Start: 2023-02-28

## 2023-03-07 ENCOUNTER — SOCIAL WORK (OUTPATIENT)
Dept: BEHAVIORAL/MENTAL HEALTH CLINIC | Facility: CLINIC | Age: 58
End: 2023-03-07

## 2023-03-07 DIAGNOSIS — F31.81 BIPOLAR II DISORDER (HCC): ICD-10-CM

## 2023-03-07 DIAGNOSIS — F33.1 MAJOR DEPRESSIVE DISORDER, RECURRENT EPISODE, MODERATE (HCC): Primary | ICD-10-CM

## 2023-03-07 NOTE — PSYCH
Behavioral Health Psychotherapy Progress Note    Psychotherapy Provided: Individual Psychotherapy     1  Major depressive disorder, recurrent episode, moderate ([facility] [address] )        2  Bipolar II disorder ([facility] )            Goals addressed in session: Goal 1     DATA:   Delia Clay said she got the results from her sleep study and said she does not have sleep apnea  She said she still nods off when at home and is watching TV and said she's fine when she's more interactive with someone or an activity  Delia Clay talked today about the stress and concern she has regarding her granddaughter, who client raised and is now in 10th grade, and her anxiety an poor grades and emotional breakdowns at school  She feels a bit like she's "not doing enough" but this clinician reminded her that she is talking to the school and [facility] to get her all the help she needs  During this session, this clinician used the following therapeutic modalities: Solution-Focused Therapy    Substance Abuse was not addressed during this session  If the client is diagnosed with a co-occurring substance use disorder, please indicate any changes in the frequency or amount of use: N/A  Stage of change for addressing substance use diagnoses: No substance use/Not applicable    ASSESSMENT:  Murali Negron presents with a Euthymic/ normal and Dysthymic mood  Delia Clay scored 10 today on the PHQ-9 screener indicating mild/moderate depression  She said she would have scored lower if not for her granddaughter's issues and her concern for her      her affect is Normal range and intensity and Flat, which is congruent, with her mood and the content of the session  The client has made progress on their goals  Murali Negron presents with a none risk of suicide, none risk of self-harm, and none risk of harm to others  For any risk assessment that surpasses a "low" rating, a safety plan must be developed      A safety plan was indicated: no  If yes, describe in detail N/A    PLAN: Between sessions, Yu Luis will try to be a little more active due to her sleep issue during the day and be mindful that she's doing all she can to help her granddaughter and give herself credit for this  At the next session, the therapist will use Solution-Focused Therapy to address goals/needs/concerns  Behavioral Health Treatment Plan and Discharge Planning: Yu Luis is aware of and agrees to continue to work on their treatment plan  They have identified and are working toward their discharge goals   yes    Visit start and stop times:    03/07/23  Start Time: 0900  Stop Time: 0948  Total Visit Time: 48 minutes

## 2023-04-03 RX ORDER — OLANZAPINE 10 MG/1
10 TABLET ORAL
Status: CANCELLED | OUTPATIENT
Start: 2023-04-03

## 2023-04-06 ENCOUNTER — TELEMEDICINE (OUTPATIENT)
Dept: PSYCHIATRY | Facility: CLINIC | Age: 58
End: 2023-04-06

## 2023-04-06 DIAGNOSIS — F33.1 MAJOR DEPRESSIVE DISORDER, RECURRENT EPISODE, MODERATE (HCC): Primary | ICD-10-CM

## 2023-04-06 DIAGNOSIS — F31.81 BIPOLAR II DISORDER (HCC): ICD-10-CM

## 2023-04-06 RX ORDER — OLANZAPINE 5 MG/1
TABLET ORAL
Qty: 30 TABLET | Refills: 1 | Status: SHIPPED | OUTPATIENT
Start: 2023-04-06

## 2023-04-06 NOTE — PATIENT INSTRUCTIONS
Wants to stop Zyprexa  Decrease dose to 5 mg  Will stop if she wants after trip to [facility]  See Therapist  Will call for an appointment if she feels she needs too

## 2023-04-06 NOTE — PSYCH
Virtual Regular Visit    Verification of patient location:at home    Patient is located in the following state in which I hold an active license [place]      Assessment/Plan:       Diagnoses and all orders for this visit:    Major depressive disorder, recurrent episode, moderate (HCC)  -     OLANZapine (ZyPREXA) 5 mg tablet; Take 1 PO Q HS    Bipolar II disorder (HCC)  -     OLANZapine (ZyPREXA) 5 mg tablet; Take 1 PO Q HS          Goals addressed in session:   Good Health  Decrease meds  Counseling provided:      Treatment Recommendations- Risks Benefits       Immediate Medical/Psychiatric/Psychotherapy Treatments and Any Precautions:     Risks, Benefits And Possible Side Effects Of Medications:  Risks, benefits, and possible side effects of medications explained to patient and patient verbalizes understanding    Controlled Medication Discussion: The patient has been filling controlled prescriptions on time as prescribed to [place] Prescription Drug Monitoring program      Reason for visit is No chief complaint on file  Medication Management     Encounter provider JUAN Vieira    Provider [place] at [address] [place] [address]  [phone]      Recent Visits  No visits were found meeting these conditions  Showing recent visits within past 7 days and meeting all other requirements  Today's Visits  Date Type Provider Dept   04/06/23 Telemedicine Rolf Holland [facility] today's visits and meeting all other requirements  Future Appointments  No visits were found meeting these conditions  Showing future appointments within next 150 days and meeting all other requirements       The patient was identified by name and date of birth  Meng Scott was informed that this is a telemedicine visit and [facility] the [facility]  She agrees to proceed  [facility] Janna "My office door was closed  No one else was in the room  She acknowledged consent and understanding of privacy and security of the video platform  The patient has agreed to participate and understands they can discontinue the visit at any time  Patient is aware this is a billable service  Subjective    Zachary Jones is a 61 y o  female    Here today for a med check  This was via [facility]    normal appetite      HPI Mood good  Stable  Denies anxiety  Ran out of Zyprexa and has not been on it since last month  \"Back on it now\"  Wants to stop it  No problems with medication   Appetite Sleep good  Health OK  Denies SI/HI    Past Medical History:   Diagnosis Date   • Chronic low back pain    • Chronic pain    • Diabetes mellitus ([facility] [address] )    • Diabetic neuropathy, painful ([facility] [address] )    • Disease of thyroid gland    • Hyperlipidemia    • Hypertension    • Obesity    • Psychiatric disorder        Past Surgical History:   Procedure Laterality Date   • APPENDECTOMY     • CARPAL TUNNEL RELEASE Bilateral    • FOOT NEUROMA SURGERY Left    • NERVE BLOCK      B/L simpethetic nerve block x3   • SACRAL NERVE STIMULATOR PLACEMENT     • SPINE SURGERY         Current Outpatient Medications   Medication Sig Dispense Refill   • OLANZapine (ZyPREXA) 5 mg tablet Take 1 PO Q HS 30 tablet 1   • amitriptyline (ELAVIL) 10 mg tablet Take 50 mg by mouth daily at bedtime      • Clobetasol Propionate 0 025 % CREA Apply topically     • cloNIDine (CATAPRES) 0 1 mg tablet Take 0 1 mg by mouth every 12 (twelve) hours     • DULoxetine (CYMBALTA) 30 mg delayed release capsule Take 90 mg by mouth daily       • levothyroxine 125 mcg tablet Take 100 mcg by mouth daily      • lidocaine (LIDODERM) 5 % Apply 1 patch topically daily Remove & Discard patch within 12 hours or as directed by MD     • lisinopril (ZESTRIL) 10 mg tablet Take 10 mg by mouth daily     • metFORMIN (GLUCOPHAGE) 500 mg tablet Take 250 mg by mouth 2 (two) times a day with meals     • " morphine (SCARLET) 60 MG 24 hr capsule Take 60 mg by mouth daily     • other medication, see sig, Medical Marijuana     • OxyCODONE HCl ER (OxyCONTIN) 30 MG T12A Take 30 mg by mouth 4 (four) times a day     • pravastatin (PRAVACHOL) 10 mg tablet Take 10 mg by mouth daily     • pregabalin (LYRICA) 100 mg capsule Take 100 mg by mouth 2 (two) times a day     • TRAZODONE HCL PO Take 50 mg by mouth 50mg to 100mg daily as needed       No current facility-administered medications for this visit  Allergies   Allergen Reactions   • Aripiprazole Hives and GI Intolerance   • Belbuca [Buprenorphine Hcl] Hives   • Buspar [Buspirone] Hives   • Gabapentin Hives   • Lamictal [Lamotrigine] Hives   • Morphine Hives   • Oxycodone Hives and GI Intolerance     Generic Form of Oxy       Social History     Substance and Sexual Activity   Drug Use No       Family History   Problem Relation Age of Onset   • Uterine cancer Mother    • No Known Problems Father            Objective    Mental status:  Appearance calm and cooperative , adequate hygiene and grooming and good eye contact    Mood mood appropriate   Affect affect appropriate    Speech a normal rate and fluent   Thought Processes normal thought processes   Hallucinations no hallucinations present    Thought Content no delusions   Abnormal Thoughts no suicidal thoughts  and no homicidal thoughts    Orientation  oriented to person and place and time   Remote Memory short term memory intact and long term memory intact   Attention Span concentration intact   Intellect Appears to be of Average Intelligence   Insight Insight intact   Judgement judgment was intact   Muscle Strength Muscle strength and tone were normal and Normal gait    Language no difficulty naming common objects   Fund of Knowledge displays adequate knowledge of current events   Pain none   Pain Scale 0       Video Exam    There were no vitals filed for this visit      I spent 15 minutes directly with the patient during this visit    Patient Instructions   Wants to stop Zyprexa  Decrease dose to 5 mg  Will stop if she wants after trip to [facility]  See Therapist  Will call for an appointment if she feels she needs too       Visit Time    Visit Start Time: 0801  Visit Stop Time: 1072  Total Visit Duration: 12 min

## 2023-06-12 ENCOUNTER — TELEPHONE (OUTPATIENT)
Dept: BEHAVIORAL/MENTAL HEALTH CLINIC | Facility: CLINIC | Age: 58
End: 2023-06-12

## 2023-06-12 NOTE — TELEPHONE ENCOUNTER
"Called client for her 9am appt today  Left msg on home phone, then called cell number but got msg that \"mailbox is full  Echo Marie \" and I could not leave a voicemail    "

## 2023-08-01 ENCOUNTER — SOCIAL WORK (OUTPATIENT)
Dept: BEHAVIORAL/MENTAL HEALTH CLINIC | Facility: CLINIC | Age: 58
End: 2023-08-01
Payer: COMMERCIAL

## 2023-08-01 DIAGNOSIS — F33.1 MAJOR DEPRESSIVE DISORDER, RECURRENT EPISODE, MODERATE (HCC): ICD-10-CM

## 2023-08-01 DIAGNOSIS — F31.81 BIPOLAR II DISORDER (HCC): Primary | ICD-10-CM

## 2023-08-01 PROCEDURE — 90834 PSYTX W PT 45 MINUTES: CPT | Performed by: COUNSELOR

## 2023-08-01 NOTE — BH TREATMENT PLAN
Outpatient Behavioral Health Psychotherapy Treatment Plan    Rutherford Pallas  1965     Date of Initial Psychotherapy Assessment: Years ago   Date of Current Treatment Plan: 08/01/23  Treatment Plan Target Date: 2-1-24  Treatment Plan Expiration Date: 2-1-24    Diagnosis:   1. Bipolar II disorder ([address] )        2. Major depressive disorder, recurrent episode, moderate (HCC)            Area(s) of Need: Depression. Anxiety and home life with granddaughter stressful    Long Term Goal 1 (in the client's own words): I want to feel more connected to Alvester Life    Stage of Change: Action    Target Date for completion: TBD     Anticipated therapeutic modalities: Mindfulness, problem solving     People identified to complete this goal: Client      Objective 1: (identify the means of measuring success in meeting the objective): We would have a conversation where she's not telling me I'm the meanest person in the world. Objective 2: (identify the means of measuring success in meeting the objective): N/A      Long Term Goal 2 (in the client's own words): Get help to be able to deal with Alvester Life and how it makes me feel emotionally. Stage of Change: Action    Target Date for completion: TBD     Anticipated therapeutic modalities: Problem solving     People identified to complete this goal: Client       Objective 1: (identify the means of measuring success in meeting the objective): Talk therapy one time a week. Objective 2: (identify the means of measuring success in meeting the objective): N/A         I am currently under the care of a [facility] psychiatric provider: no    My [facility] psychiatric provider is: N/A    I am currently taking psychiatric medications: No    I feel that I will be ready for discharge from mental health care when I reach the following (measurable goal/objective): When I stop feeling so bad about myself and feel that Alvester Life is okay being out in the world by herself.       For children and adults who have a legal guardian:   Has there been any change to custody orders and/or guardianship status? NA. If yes, attach updated documentation. I have created my Crisis Plan and have been offered a copy of this plan    [id number]: Diagnosis and Treatment Plan explained to Mavis Dears acknowledges an understanding of their diagnosis. Silvia Lynn agrees to this treatment plan. I have been offered a copy of this Treatment Plan.  no

## 2023-08-01 NOTE — PSYCH
Behavioral Health Psychotherapy Progress Note    Psychotherapy Provided: Individual Psychotherapy     1. Bipolar II disorder ([address])        2. Major depressive disorder, recurrent episode, moderate ([address])            Goals addressed in session: Goal 1 and Goal 2     DATA:   Updated goals today and completed PHQ-9. Clara scored 5 indicating mild depression. Mekhi said that things seem "even worse" with her granddaughter 'Carey Cranker' who lives in her household and who client and her  raised from when she was very young. Mekhi said "she hates me" but I try to do everything I can for her. We talked about boundaries and limit setting as this has been an on-going issue for years. Mekhi appears to cater to Carey Cranker but would do better but setting some limits and sticking to them even if [place]. During this session, this clinician used the following therapeutic modalities: Solution-Focused Therapy    Substance Abuse was not addressed during this session. If the client is diagnosed with a co-occurring substance use disorder, please indicate any changes in the frequency or amount of use: N/A. Stage of change for addressing substance use diagnoses: No substance use/Not applicable    ASSESSMENT:  John Dubon presents with a Euthymic/ normal and Depressed mood. her affect is Normal range and intensity, which is congruent, with her mood and the content of the session. The client has made progress on their goals. She has set some limits in the past but wants to do more of it. John Dubon presents with a none risk of suicide, none risk of self-harm, and none risk of harm to others. For any risk assessment that surpasses a "low" rating, a safety plan must be developed. A safety plan was indicated: no  If yes, describe in detail N/A    PLAN: Between sessions, John Dubon will tell Carey Cranker that she is making some changes and that she needs to comply or she can't drive.  At the next session, the therapist will use Solution-Focused Therapy to address goals/needs/concerns. Behavioral Health Treatment Plan and Discharge Planning: Mary Holguin is aware of and agrees to continue to work on their treatment plan. They have identified and are working toward their discharge goals.  yes    Visit start and stop times:    08/01/23  Start Time: 0900  Stop Time: 0950  Total Visit Time: 50 minutes

## 2023-10-03 ENCOUNTER — TELEPHONE (OUTPATIENT)
Dept: PSYCHIATRY | Facility: CLINIC | Age: 58
End: 2023-10-03

## 2023-10-31 ENCOUNTER — DOCUMENTATION (OUTPATIENT)
Dept: BEHAVIORAL/MENTAL HEALTH CLINIC | Facility: CLINIC | Age: 58
End: 2023-10-31

## 2023-10-31 ENCOUNTER — SOCIAL WORK (OUTPATIENT)
Dept: BEHAVIORAL/MENTAL HEALTH CLINIC | Facility: CLINIC | Age: 58
End: 2023-10-31
Payer: COMMERCIAL

## 2023-10-31 DIAGNOSIS — F33.1 MAJOR DEPRESSIVE DISORDER, RECURRENT EPISODE, MODERATE (HCC): Primary | ICD-10-CM

## 2023-10-31 PROCEDURE — 90837 PSYTX W PT 60 MINUTES: CPT | Performed by: COUNSELOR

## 2023-10-31 NOTE — PROGRESS NOTES
Psychotherapy Discharge Summary    Preferred Name: Samy Anderson  YOB: 1965    Admission date to psychotherapy: 12-12-19    Referred by: Self    Presenting Problem: MDD and Bipolar D/O and issues with granddaughter, who she raised. Course of treatment included : individual therapy     Progress/Outcome of Treatment Goals (brief summary of course of treatment) Fair    Treatment Complications (if any): None    Treatment Progress: fair    Current [facility] Psychiatric Provider: N/A    Discharge Medications include: N/A    Discharge Date: 10-31-23    Discharge Diagnosis: No diagnosis found.     Criteria for Discharge:  Feels she can't progress further due to her issues being about her granddaughter    Aftercare recommendations include (include specific referral names and phone numbers, if appropriate): N/A    Prognosis: fair

## 2023-11-02 ENCOUNTER — TELEPHONE (OUTPATIENT)
Dept: PSYCHIATRY | Facility: CLINIC | Age: 58
End: 2023-11-02

## 2024-01-01 NOTE — PSYCH
Behavioral Health Psychotherapy Progress Note    Psychotherapy Provided: Individual Psychotherapy     No diagnosis found. Goals addressed in session: Goal 1     DATA: Saranya Fortune said she is still struggling with her granddaughter Chang Carlson. She said she has no other issues and said she has great supports with her sister, friends, and her . Saranya Fortune said "school has made suggestions and you've tried to help too but nothing is working."  She said "I'll be ending therapy today but if I need you for something else that arises in my life, I'll definitely call you."  We talked about good self care with having to deal with the stress of her granddaughter, who she has raised, and Saranya Fortune said she tries to do nice things for herself. She also mentioned that Fentanyl was found in her system "3 times" by her pain dr and said she can not continue with him now. Saranya Fortune said she only take Oxycodone for her pain and has "no idea" where the Fentanyl came from. During this session, this clinician used the following therapeutic modalities: Supportive Psychotherapy    Substance Abuse was not addressed during this session. If the client is diagnosed with a co-occurring substance use disorder, please indicate any changes in the frequency or amount of use: N/A. Stage of change for addressing substance use diagnoses: No substance use/Not applicable as per client. ASSESSMENT:  Hunter Farias presents with a Euthymic/ normal mood. her affect is Normal range and intensity, which is congruent, with her mood and the content of the session. The client has made progress on their goals. Hunter Farias presents with a none risk of suicide, none risk of self-harm, and none risk of harm to others. For any risk assessment that surpasses a "low" rating, a safety plan must be developed.     A safety plan was indicated: no  If yes, describe in detail N/A    PLAN: Between sessions, Hunter Farias will practice good self care however this clinician will be discharging client today at her request. She does appear stable at time of discharge. At the next session, the therapist will use N/A to address N/A. Behavioral Health Treatment Plan and Discharge Planning: Rhonda Degroot is aware of and agrees to continue to work on their treatment plan. They have identified and are working toward their discharge goals.  yes    Visit start and stop times:    10/31/23  Start Time: 0800  Stop Time: 0853  Total Visit Time: 53 minutes [id number]

## 2025-08-04 ENCOUNTER — VBI (OUTPATIENT)
Dept: ADMINISTRATIVE | Facility: OTHER | Age: 60
End: 2025-08-04

## 2025-08-05 DIAGNOSIS — E11.9 TYPE 2 DIABETES MELLITUS WITHOUT COMPLICATION, WITHOUT LONG-TERM CURRENT USE OF INSULIN (HCC): Primary | ICD-10-CM

## 2025-08-08 PROBLEM — E66.01 MORBID (SEVERE) OBESITY DUE TO EXCESS CALORIES (HCC): Status: ACTIVE | Noted: 2025-08-08

## 2025-08-08 PROBLEM — L90.0 LICHEN SCLEROSUS ET ATROPHICUS: Status: ACTIVE | Noted: 2025-08-08

## 2025-08-08 PROBLEM — E11.65 TYPE 2 DIABETES MELLITUS WITH HYPERGLYCEMIA (HCC): Status: ACTIVE | Noted: 2025-08-08

## 2025-08-08 PROBLEM — G47.10 HYPERSOMNIA, UNSPECIFIED: Status: ACTIVE | Noted: 2025-08-08

## 2025-08-08 PROBLEM — R13.10 DYSPHAGIA, UNSPECIFIED: Status: ACTIVE | Noted: 2025-08-08

## 2025-08-08 PROBLEM — F11.20 OPIOID DEPENDENCE, UNCOMPLICATED (HCC): Status: ACTIVE | Noted: 2025-08-08

## 2025-08-08 PROBLEM — F31.32 BIPOLAR DISORDER, CURRENT EPISODE DEPRESSED, MODERATE (HCC): Status: ACTIVE | Noted: 2025-08-08

## 2025-08-08 PROBLEM — I73.9 PERIPHERAL VASCULAR DISEASE, UNSPECIFIED (HCC): Status: ACTIVE | Noted: 2025-08-08

## 2025-08-08 PROBLEM — M46.06: Status: ACTIVE | Noted: 2025-08-08

## 2025-08-08 PROBLEM — I10 ESSENTIAL (PRIMARY) HYPERTENSION: Status: ACTIVE | Noted: 2025-08-08

## 2025-08-08 PROBLEM — E78.00 PURE HYPERCHOLESTEROLEMIA, UNSPECIFIED: Status: ACTIVE | Noted: 2025-08-08

## 2025-08-08 PROBLEM — G95.20 UNSPECIFIED CORD COMPRESSION (HCC): Status: ACTIVE | Noted: 2025-08-08

## 2025-08-08 PROBLEM — N84.0 POLYP OF CORPUS UTERI: Status: ACTIVE | Noted: 2025-08-08

## 2025-08-08 PROBLEM — F41.9 ANXIETY DISORDER, UNSPECIFIED: Status: ACTIVE | Noted: 2025-08-08

## 2025-08-08 PROBLEM — F12.20 CANNABIS DEPENDENCE, UNCOMPLICATED (HCC): Status: ACTIVE | Noted: 2025-08-08

## 2025-08-08 PROBLEM — G62.9 POLYNEUROPATHY, UNSPECIFIED: Status: ACTIVE | Noted: 2025-08-08

## 2025-08-08 PROBLEM — E03.9 HYPOTHYROIDISM, UNSPECIFIED: Status: ACTIVE | Noted: 2025-08-08

## 2025-08-08 PROBLEM — E11.42 TYPE 2 DIABETES MELLITUS WITH DIABETIC POLYNEUROPATHY (HCC): Status: ACTIVE | Noted: 2025-08-08

## 2025-08-08 PROBLEM — R29.2 ABNORMAL REFLEX: Status: ACTIVE | Noted: 2025-08-08

## 2025-08-08 PROBLEM — K21.9 GASTRO-ESOPHAGEAL REFLUX DISEASE WITHOUT ESOPHAGITIS: Status: ACTIVE | Noted: 2025-08-08

## 2025-08-08 PROBLEM — R41.9 UNSPECIFIED SYMPTOMS AND SIGNS INVOLVING COGNITIVE FUNCTIONS AND AWARENESS: Status: ACTIVE | Noted: 2025-08-08

## 2025-08-08 PROBLEM — N95.2 POSTMENOPAUSAL ATROPHIC VAGINITIS: Status: ACTIVE | Noted: 2025-08-08

## 2025-08-08 PROBLEM — N62 HYPERTROPHY OF BREAST: Status: ACTIVE | Noted: 2025-08-08

## 2025-08-13 ENCOUNTER — VBI (OUTPATIENT)
Dept: ADMINISTRATIVE | Facility: OTHER | Age: 60
End: 2025-08-13